# Patient Record
Sex: FEMALE | Race: WHITE
[De-identification: names, ages, dates, MRNs, and addresses within clinical notes are randomized per-mention and may not be internally consistent; named-entity substitution may affect disease eponyms.]

---

## 2019-12-27 ENCOUNTER — HOSPITAL ENCOUNTER (EMERGENCY)
Dept: HOSPITAL 63 - ER | Age: 24
Discharge: HOME | End: 2019-12-27
Payer: COMMERCIAL

## 2019-12-27 VITALS — SYSTOLIC BLOOD PRESSURE: 123 MMHG | DIASTOLIC BLOOD PRESSURE: 64 MMHG

## 2019-12-27 VITALS — WEIGHT: 114.64 LBS | BODY MASS INDEX: 21.1 KG/M2 | HEIGHT: 62 IN

## 2019-12-27 DIAGNOSIS — J10.1: Primary | ICD-10-CM

## 2019-12-27 LAB
ALBUMIN SERPL-MCNC: 4.3 G/DL (ref 3.4–5)
ALBUMIN/GLOB SERPL: 1.1 {RATIO} (ref 1–1.7)
ALP SERPL-CCNC: 52 U/L (ref 46–116)
ALT SERPL-CCNC: 24 U/L (ref 14–59)
ANION GAP SERPL CALC-SCNC: 13 MMOL/L (ref 6–14)
APTT PPP: YELLOW S
AST SERPL-CCNC: 21 U/L (ref 15–37)
BACTERIA #/AREA URNS HPF: (no result) /HPF
BASOPHILS # BLD AUTO: 0 X10^3/UL (ref 0–0.2)
BASOPHILS NFR BLD: 1 % (ref 0–3)
BILIRUB SERPL-MCNC: 0.3 MG/DL (ref 0.2–1)
BILIRUB UR QL STRIP: (no result)
BUN/CREAT SERPL: 11 (ref 6–20)
CA-I SERPL ISE-MCNC: 8 MG/DL (ref 7–20)
CALCIUM SERPL-MCNC: 9 MG/DL (ref 8.5–10.1)
CHLORIDE SERPL-SCNC: 100 MMOL/L (ref 98–107)
CO2 SERPL-SCNC: 23 MMOL/L (ref 21–32)
CREAT SERPL-MCNC: 0.7 MG/DL (ref 0.6–1)
EOSINOPHIL NFR BLD: 0 % (ref 0–3)
EOSINOPHIL NFR BLD: 0 X10^3/UL (ref 0–0.7)
ERYTHROCYTE [DISTWIDTH] IN BLOOD BY AUTOMATED COUNT: 12.7 % (ref 11.5–14.5)
FIBRINOGEN PPP-MCNC: CLEAR MG/DL
GFR SERPLBLD BASED ON 1.73 SQ M-ARVRAT: 102.8 ML/MIN
GLOBULIN SER-MCNC: 3.9 G/DL (ref 2.2–3.8)
GLUCOSE SERPL-MCNC: 86 MG/DL (ref 70–99)
GLUCOSE UR STRIP-MCNC: (no result) MG/DL
HCT VFR BLD CALC: 44.6 % (ref 36–47)
HGB BLD-MCNC: 15.1 G/DL (ref 12–15.5)
INFLUENZA A PATIENT: NEGATIVE
INFLUENZA B PATIENT: POSITIVE
LYMPHOCYTES # BLD: 0.6 X10^3/UL (ref 1–4.8)
LYMPHOCYTES NFR BLD AUTO: 13 % (ref 24–48)
MCH RBC QN AUTO: 32 PG (ref 25–35)
MCHC RBC AUTO-ENTMCNC: 34 G/DL (ref 31–37)
MCV RBC AUTO: 95 FL (ref 79–100)
MONO #: 0.4 X10^3/UL (ref 0–1.1)
MONOCYTES NFR BLD: 10 % (ref 0–9)
NEUT #: 3.3 X10^3UL (ref 1.8–7.7)
NEUTROPHILS NFR BLD AUTO: 77 % (ref 31–73)
NITRITE UR QL STRIP: (no result)
PLATELET # BLD AUTO: 179 X10^3/UL (ref 140–400)
POTASSIUM SERPL-SCNC: 3.5 MMOL/L (ref 3.5–5.1)
PROT SERPL-MCNC: 8.2 G/DL (ref 6.4–8.2)
RBC # BLD AUTO: 4.7 X10^6/UL (ref 3.5–5.4)
RBC #/AREA URNS HPF: (no result) /HPF (ref 0–2)
SODIUM SERPL-SCNC: 136 MMOL/L (ref 136–145)
SP GR UR STRIP: >=1.03
SQUAMOUS #/AREA URNS LPF: (no result) /LPF
UROBILINOGEN UR-MCNC: 0.2 MG/DL
WBC # BLD AUTO: 4.3 X10^3/UL (ref 4–11)
WBC #/AREA URNS HPF: (no result) /HPF (ref 0–4)

## 2019-12-27 PROCEDURE — 36415 COLL VENOUS BLD VENIPUNCTURE: CPT

## 2019-12-27 PROCEDURE — 94640 AIRWAY INHALATION TREATMENT: CPT

## 2019-12-27 PROCEDURE — 87880 STREP A ASSAY W/OPTIC: CPT

## 2019-12-27 PROCEDURE — 81025 URINE PREGNANCY TEST: CPT

## 2019-12-27 PROCEDURE — 80053 COMPREHEN METABOLIC PANEL: CPT

## 2019-12-27 PROCEDURE — 87804 INFLUENZA ASSAY W/OPTIC: CPT

## 2019-12-27 PROCEDURE — 99285 EMERGENCY DEPT VISIT HI MDM: CPT

## 2019-12-27 PROCEDURE — 87070 CULTURE OTHR SPECIMN AEROBIC: CPT

## 2019-12-27 PROCEDURE — 81001 URINALYSIS AUTO W/SCOPE: CPT

## 2019-12-27 PROCEDURE — 71046 X-RAY EXAM CHEST 2 VIEWS: CPT

## 2019-12-27 PROCEDURE — 85025 COMPLETE CBC W/AUTO DIFF WBC: CPT

## 2019-12-27 NOTE — RAD
CHEST PA   LATERAL

 

History: Fever and cough 

 

Comparison: None.

 

Findings: 

Frontal and lateral views of chest were obtained. The cardiomediastinal 

silhouette is normal. Pulmonary vasculature is normal. The lungs are 

clear. No pleural effusion or pneumothorax is seen. There is no acute bone

abnormality. 

 

IMPRESSION: 

No acute cardiopulmonary process. 

 

 

Electronically signed by: Adan Smith MD (12/27/2019 11:01 PM) [facility]

## 2019-12-27 NOTE — PHYS DOC
Adult General


Chief Complaint


Chief Complaint:  FLU SYMPTOM





HPI


HPI


24-year-old female presents with 2 day history of cough, chest congestion. She 

developed a fever of 102 today that did not go down below 101 with taking a gram

of Tylenol. 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 She is concern for lung or influenza. The patient has mild 

intermittent asthma, but did not bring her here with her. [place] 

[place]. She does not think she's been wheezing, but does have chest congestion. 

She did get her flu shot at the end of September.





Review of Systems


Review of Systems





Constitutional: Fever, body aches[]


Eyes: Denies change in visual acuity, redness, or eye pain []


HENT: Denies nasal congestion or sore throat []


Respiratory: Cough with mild shortness of breath []


Cardiovascular: No additional information not addressed in HPI []


GI: Denies abdominal pain, nausea, vomiting, bloody stools or diarrhea []


: Denies dysuria or hematuria []


Musculoskeletal: Denies back pain or joint pain []


Integument: Denies rash or skin lesions []


Neurologic: Denies headache, focal weakness or sensory changes []


Endocrine: Denies polyuria or polydipsia []





All other systems were reviewed and found to be within normal limits, except as 

documented in this note.





Current Medications


Current Medications





Current Medications








 Medications


  (Trade)  Dose


 Ordered  Sig/Stephanie  Start Time


 Stop Time Status Last Admin


Dose Admin


 


 Albuterol Sulfate


  (Ventolin Hfa


 Inhaler)  2 puff  1X  ONCE  12/27/19 20:00


 12/27/19 20:01 UNV  





 


 Sodium Chloride  1,000 ml @ 


 1,000 mls/hr  1X  ONCE  12/27/19 19:30


 12/27/19 20:29   














Allergies


Allergies





Allergies








Coded Allergies Type Severity Reaction Last Updated Verified


 


  No Known Drug Allergies    12/27/19 No











Physical Exam


Physical Exam





Constitutional: Well developed, well nourished, no acute distress, non-toxic 

appearance. []


HENT: Normocephalic, atraumatic, bilateral external ears normal, oropharynx 

moist, no oral exudates, nose normal. []


Eyes: PERRLA, EOMI, conjunctiva normal, no discharge. [] 


Neck: Normal range of motion, no tenderness, supple, no stridor. [] 


Cardiovascular:Heart rate regular rhythm, no murmur []


Lungs & Thorax:  Bilateral breath sounds clear to auscultation []


Abdomen: Bowel sounds normal, soft, no tenderness, no masses, no pulsatile 

masses. [] 


Skin: Warm, dry, no erythema, no rash. [] 


Back: No tenderness, no CVA tenderness. [] 


Extremities: No tenderness, no cyanosis, no clubbing, ROM intact, no edema. [] 


Neurologic: Alert and oriented X 3, normal motor function, normal sensory 

function, no focal deficits noted. []


Psychologic: Affect normal, judgement normal, mood normal. []





Current Patient Data


Vital Signs





                                   Vital Signs








  Date Time  Temp Pulse Resp B/P (MAP) Pulse Ox O2 Delivery O2 Flow Rate FiO2


 


12/27/19 19:30 100.2 104 22 123/78 (93)  Room Air 99.0 











EKG


EKG


[]





Radiology/Procedures


Radiology/Procedures


[]





Course & Med Decision Making


Course & Med Decision Making


Pertinent Labs and Imaging studies reviewed. (See chart for details)


The patient is positive for influenza B. I will treat her with Tamiflu as she is

 within 48 hours of symptom onset. I will also discharge her with a prescription

 for same.


[]





Dragon Disclaimer


Dragon Disclaimer


This electronic medical record was generated, in whole or in part, using a voice

 recognition dictation system.





Departure


Departure:


Impression:  


   Primary Impression:  


   Influenza B


Disposition:  01 HOME, SELF-CARE


Condition:  STABLE


Referrals:  


PCP,NO (PCP)


Patient Instructions:  Influenza, Adult


Scripts


Oseltamivir Phosphate (TAMIFLU) 75 Mg Capsule


1 CAP PO BID for influenza for 5 Days, #10 CAP


   Prov: GRISEL WARREN DO         12/27/19











GRISEL WARREN DO                 Dec 27, 2019 19:59